# Patient Record
Sex: MALE | Race: WHITE | Employment: UNEMPLOYED | RURAL
[De-identification: names, ages, dates, MRNs, and addresses within clinical notes are randomized per-mention and may not be internally consistent; named-entity substitution may affect disease eponyms.]

---
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
## 2022-01-14 ENCOUNTER — HOSPITAL ENCOUNTER (EMERGENCY)
Facility: HOSPITAL | Age: 22
Discharge: HOME | End: 2022-01-14
Attending: EMERGENCY MEDICINE | Admitting: EMERGENCY MEDICINE

## 2022-01-14 VITALS
SYSTOLIC BLOOD PRESSURE: 120 MMHG | HEIGHT: 69 IN | WEIGHT: 170 LBS | BODY MASS INDEX: 25.18 KG/M2 | HEART RATE: 71 BPM | TEMPERATURE: 98.7 F | DIASTOLIC BLOOD PRESSURE: 73 MMHG | RESPIRATION RATE: 18 BRPM | OXYGEN SATURATION: 98 %

## 2022-01-14 DIAGNOSIS — Z20.822 CLOSE EXPOSURE TO COVID-19 VIRUS: ICD-10-CM

## 2022-01-14 DIAGNOSIS — R19.7 DIARRHEA, UNSPECIFIED TYPE: ICD-10-CM

## 2022-01-14 DIAGNOSIS — J06.9 UPPER RESPIRATORY TRACT INFECTION, UNSPECIFIED TYPE: Primary | ICD-10-CM

## 2022-01-14 LAB
FLUAV RNA RESP QL NAA+PROBE: NOT DETECTED
FLUBV RNA RESP QL NAA+PROBE: NOT DETECTED
RSV RNA NPH QL NAA+NON-PROBE: NOT DETECTED
SARS-COV-2 RNA RESP QL NAA+PROBE: NOT DETECTED

## 2022-01-14 PROCEDURE — 87636 SARSCOV2 & INF A&B AMP PRB: CPT | Performed by: EMERGENCY MEDICINE

## 2022-01-14 PROCEDURE — 99283 EMERGENCY DEPT VISIT LOW MDM: CPT

## 2022-01-14 PROCEDURE — 87637 SARSCOV2&INF A&B&RSV AMP PRB: CPT | Performed by: EMERGENCY MEDICINE

## 2022-01-14 PROCEDURE — C9803 HOPD COVID-19 SPEC COLLECT: HCPCS | Performed by: EMERGENCY MEDICINE

## 2022-01-14 NOTE — ED PROVIDER NOTES
Subjective   Pt is a 20 yo male presenting to ED with complaints of Covid exposure. PMHx significant for back pain. Pt reports developing symptoms about a week ago of congestion, sore throat, cough, body aches, headaches and diarrhea. He reports symptoms have improved but girlfriend tested positive yesterday for Covid. Pt has not been vaccinated for Covid. He works at [employer]. He drinks ETOH occasionally and uses tobacco and marijauan.       History provided by:  Patient and medical records      Review of Systems   Constitutional: Positive for chills and fatigue. Negative for fever.   HENT: Positive for congestion and sore throat.    Eyes: Negative for visual disturbance.   Respiratory: Positive for cough. Negative for shortness of breath.    Cardiovascular: Negative for chest pain.   Gastrointestinal: Positive for diarrhea. Negative for abdominal pain, nausea and vomiting.   Genitourinary: Negative for difficulty urinating.   Musculoskeletal: Positive for myalgias. Negative for arthralgias and back pain.   Neurological: Positive for headaches. Negative for dizziness and weakness.       Past Medical History:   Diagnosis Date   • Herniated lumbar intervertebral disc        No Known Allergies    Past Surgical History:   Procedure Laterality Date   • APPENDECTOMY N/A 9/9/2019    Procedure: APPENDECTOMY LAPAROSCOPIC;  Surgeon: Whitney Costello MD;  Location: [facility];  Service: General   • TOOTH EXTRACTION         No family history on file.    Social History     Socioeconomic History   • Marital status: Single   Tobacco Use   • Smoking status: Current Every Day Smoker     Packs/day: 0.25     Years: 0.50     Pack years: 0.12     Types: Cigarettes   • Smokeless tobacco: Former User   Substance and Sexual Activity   • Alcohol use: Yes     Comment: one pint of whisky once a month   • Drug use: Yes     Types: Marijuana     Comment: 2 months ago   • Sexual activity: Defer           Objective   Physical Exam  Vitals and  "nursing note reviewed.   Constitutional:       General: He is not in acute distress.  HENT:      Head: Atraumatic.   Eyes:      Extraocular Movements: Extraocular movements intact.      Conjunctiva/sclera: Conjunctivae normal.   Cardiovascular:      Rate and Rhythm: Normal rate and regular rhythm.   Pulmonary:      Effort: Pulmonary effort is normal. No respiratory distress.   Musculoskeletal:         General: Normal range of motion.      Cervical back: Normal range of motion and neck supple.   Skin:     General: Skin is warm.      Capillary Refill: Capillary refill takes less than 2 seconds.   Neurological:      General: No focal deficit present.      Mental Status: He is alert.   Psychiatric:         Mood and Affect: Mood normal.         Behavior: Behavior normal.         Procedures           ED Course      Discussed results and tx plan with patient. He will tx symptoms with OTC meds. He will be contacted if Covid test is positive. Went over new / worse sx to return to ED.     No results found for this or any previous visit (from the past 24 hour(s)).  Note: In addition to lab results from this visit, the labs listed above may include labs taken at another facility or during a different encounter within the last 24 hours. Please correlate lab times with ED admission and discharge times for further clarification of the services performed during this visit.    No orders to display     Vitals:    01/14/22 1419   BP: 120/73   BP Location: Left arm   Patient Position: Sitting   Pulse: 71   Resp: 18   Temp: 98.7 °F (37.1 °C)   TempSrc: Oral   SpO2: 98%   Weight: 77.1 kg (170 lb)   Height: 175.3 cm (69\")     Medications - No data to display  ECG/EMG Results (last 24 hours)     ** No results found for the last 24 hours. **        No orders to display       DISCHARGE    Patient discharged in stable condition.    Reviewed implications of results, diagnosis, meds, responsibility to follow up, warning signs and symptoms of " possible worsening, potential complications and reasons to return to ER.    Patient/Family voiced understanding of above instructions.    Discussed plan for discharge, as there is no emergent indication for admission.  Pt/family is agreeable and understands need for follow up and possible repeat testing.  Pt/family is aware that discharge does not mean that nothing is wrong but that it indicates no emergency is currently present that requires admission and they must continue care with follow-up as given below or with a physician of their choice.     FOLLOW-UP  Suzie Gamboa MD  [address] [place] [id number]  [phone]    Schedule an appointment as soon as possible for a visit       [facility] Emergency Department  [address] [place] [address] [phone]    If symptoms worsen         Medication List      No changes were made to your prescriptions during this visit.                                                  MDM    Final diagnoses:   Upper respiratory tract infection, unspecified type   Close exposure to COVID-19 virus   Diarrhea, unspecified type       ED Disposition  ED Disposition     ED Disposition Condition Comment    Discharge Stable           Suzie Gamboa MD  [address] [place] [id number]  [phone]    Schedule an appointment as soon as possible for a visit       [facility] Emergency Department  [address] [place] [address] [phone]    If symptoms worsen         Medication List      No changes were made to your prescriptions during this visit.          Dedra Link PA  01/14/22 1508

## 2022-05-01 NOTE — DISCHARGE INSTRUCTIONS
No pushing, pulling, tugging, heavy lifting, or strenuous activity.  No major decision making, driving, or drinking alcoholic beverages for 24 hours. (due to the medications you have received)  Always use good hand hygiene/washing techniques.  NO driving while taking pain medications.    To assist you in voiding:  Drink plenty of fluids  Listen to running water while attempting to void.    If you are unable to urinate and you have an uncomfortable urge to void or it has been   6 hours since you were discharged, return to the Emergency Room  
show

## 2023-02-05 ENCOUNTER — HOSPITAL ENCOUNTER (EMERGENCY)
Facility: HOSPITAL | Age: 23
Discharge: HOME | End: 2023-02-05
Attending: HOSPITALIST
Payer: MEDICAID

## 2023-02-05 ENCOUNTER — APPOINTMENT (OUTPATIENT)
Dept: GENERAL RADIOLOGY | Facility: HOSPITAL | Age: 23
End: 2023-02-05
Payer: MEDICAID

## 2023-02-05 VITALS
HEIGHT: 69 IN | BODY MASS INDEX: 28.14 KG/M2 | WEIGHT: 190 LBS | SYSTOLIC BLOOD PRESSURE: 126 MMHG | TEMPERATURE: 98.1 F | DIASTOLIC BLOOD PRESSURE: 77 MMHG | HEART RATE: 67 BPM | RESPIRATION RATE: 20 BRPM | OXYGEN SATURATION: 97 %

## 2023-02-05 DIAGNOSIS — S62.336A CLOSED DISPLACED FRACTURE OF NECK OF FIFTH METACARPAL BONE OF RIGHT HAND, INITIAL ENCOUNTER: Primary | ICD-10-CM

## 2023-02-05 PROCEDURE — 73130 X-RAY EXAM OF HAND: CPT

## 2023-02-05 PROCEDURE — 29125 APPL SHORT ARM SPLINT STATIC: CPT

## 2023-02-05 PROCEDURE — 99283 EMERGENCY DEPT VISIT LOW MDM: CPT

## 2023-02-05 ASSESSMENT — PAIN SCALES - GENERAL: PAINLEVEL_OUTOF10: 0

## 2023-02-05 ASSESSMENT — PAIN - FUNCTIONAL ASSESSMENT: PAIN_FUNCTIONAL_ASSESSMENT: 0-10

## 2023-02-05 NOTE — ED PROVIDER NOTES
[address] [facility]      Pt Name: Agustin Drew  MRN: [id number]  YOB: 2000  Date of evaluation: 2/5/2023  Provider: Javan Bernal DO    CHIEF COMPLAINT       Chief Complaint   Patient presents with    Hand Injury     Patient stated he was changing a tire and injured his right hand and right side of forehead last night. HISTORY OF PRESENT ILLNESS  (Location/Symptom, Timing/Onset, Context/Setting, Quality, Duration, Modifying Factors, Severity.)   Agustin Drew is a 23 y.o. male who presents to the emergency department for right hand pain. Patient states that he was working last night trying to change a tire with a breaking bar when it slipped he Maudine Lauren forward and his hand hit the tire and then he fell against a car in his hand both. Patient states he also hit his head but he had no loss of consciousness. States he did not have any episodes of vomiting 2 hours within the head injury. Patient states that his head is perfectly fine but what is bothering him is his right hand. States he has had a previous boxer's fracture to that hand in the past.  He is complaining of pain over the fifth and fourth metacarpal area of the hand. Patient states he does have some color change or bruising to the dorsum of the hand over the area of the third metacarpal.  He also has some mild bruising to the palm of the hand also. He denies any wrist pain. He denies any elbow pain denies any shoulder pain. Patient is right-hand dominant. Denies any numbness tingling weakness to the hand. He states as long as he is not do anything with it he did really does not have any pain at the moment he touches it or moves it and his pain is about a 8 out of 10. Describes as a sharp sensation. Nursing notes were reviewed.     REVIEW OFSYSTEMS    (2-9 systems for level 4, 10 or more for level 5)   ROS:  General:  No fevers, no chills, no weakness  Cardiovascular:  No chest pain, no palpitations  Respiratory:  No shortness of breath, no cough, no wheezing  Gastrointestinal:  No pain, no nausea, no vomiting, no diarrhea  Musculoskeletal:  No muscle pain, +right hand pain  Skin:  No rash, no easy bruising  Neurologic:  No speech problems, no headache, no extremity weakness  Psychiatric:  No anxiety  Genitourinary:  No dysuria, no hematuria    Except as noted above the remainder of the review of systems was reviewed and negative. PAST MEDICAL HISTORY     Past Medical History:   Diagnosis Date    Lumbar herniated disc          SURGICAL HISTORY       Past Surgical History:   Procedure Laterality Date    APPENDECTOMY      MOUTH SURGERY           CURRENT MEDICATIONS       Previous Medications    No medications on file       ALLERGIES     Patient has no known allergies. FAMILY HISTORY     History reviewed. No pertinent family history. SOCIAL HISTORY       Social History     Socioeconomic History    Marital status: Single     Spouse name: None    Number of children: None    Years of education: None    Highest education level: None   Tobacco Use    Smoking status: Every Day     Packs/day: 0.25     Years: 2.00     Pack years: 0.50     Types: Cigarettes    Smokeless tobacco: Former   Substance and Sexual Activity    Alcohol use: No    Drug use: No         PHYSICAL EXAM    (up to 7 for level 4, 8 or more for level 5)     ED Triage Vitals [02/05/23 1041]   BP Temp Temp Source Heart Rate Resp SpO2 Height Weight   126/77 98.1 °F (36.7 °C) Oral 67 20 97 % 5' 9\" (1.753 m) 190 lb (86.2 kg)       Physical Exam  General :Patient is awake, alert, oriented, in no acute distress, nontoxic appearing  HEENT: Pupils are equally round and reactive to light, EOMI  Cardiac: Heart regular rate, rhythm, no murmurs, rubs, or gallops  Musculoskeletal: No focal muscle deficits are appreciated, decreased range of motion to the fourth and fifth finger of the right hand. Cap refill is less than 3 seconds distally. Neurovascular intact distally. Patient's able to flex and extend the first second and third digits but difficulty with the fourth and fifth. He is able to flex and extend against resistance with the first second third digit but not with the fourth or fifth digit secondary to pain. She does have area of contusion over the dorsum of the hand over the third metacarpal area. He also has some mild bruising to the palmar aspect which is about mid palm which would line up over the fourth metacarpal area. Neuro: Motor intact, sensory intact, level of consciousness is normal  Dermatology: Skin is warm and dry  Psych: Mentation is grossly normal, cognition is grossly normal. Affect is appropriate. DIAGNOSTIC RESULTS     EKG: All EKG's are interpreted by the Emergency Department Physician who either signs or Co-signs this chart in the [address] a cardiologist.        RADIOLOGY:   Non-plain film images such as CT, Ultrasound and MRI are read by the radiologist. Plain radiographic images are visualized and preliminarily interpreted by the emergency physician with the below findings:      [] Radiologist's Report Reviewed:  XR HAND RIGHT (MIN 3 VIEWS)   Final Result      Distal fifth metacarpal oblique fracture with overlying soft tissue swelling and pulmonary angulation                  ED BEDSIDE ULTRASOUND:   Performed by ED Physician - none    LABS:    I have reviewed and interpreted all of the currently available lab results from this visit (ifapplicable):  No results found for this visit on 02/05/23. All other labs were within normal range or not returned as of this dictation.     EMERGENCY DEPARTMENT COURSE and DIFFERENTIAL DIAGNOSIS/MDM:   Vitals:    Vitals:    02/05/23 1041   BP: 126/77   Pulse: 67   Resp: 20   Temp: 98.1 °F (36.7 °C)   TempSrc: Oral   SpO2: 97%   Weight: 190 lb (86.2 kg)   Height: 5' 9\" (1.753 m)       MEDICATIONS ADMINISTERED IN ED:  Medications - No data to display    Initial evaluation examination I did have conversation with the patient about upcoming plan, treatment and possible disposition which they are agreeable to the times dictation. Advised that that we going perform radiograph of the right hand to rule out any acute bony abnormality. Hopefully this is just a contusion. Patient was offered something for pain here but he declined. Patient is icing the hand when I entered the room. I did discuss with him about the use of RICE for symptom relief. Advised that when he is icing it 20 minutes on 20 minutes off do not place it directly against the skin he does state his understanding of this. Patient final disposition be determined once his radiological studies been performed reviewed. Otherwise he is resting comfortably stretcher no acute distress nontoxic-appearing. Radiograph the right hand read by radiology as distal fifth metacarpal oblique fracture with overlying soft tissue swelling and angulation    Patient's diagnostic studies were discussed with him and he does state his understanding. Advised that the fracture will need splinting. He was agreeable for this we did place a ulnar gutter type splint to please see procedure note for full details he tolerated this well. Patient was offered something stronger than Tylenol Motrin for home but he declined. He also is refusing a work excuse. Patient advised that he does need to follow-up with orthopedics within the next 1 week for reevaluation. We will give him the information for local orthopedic physician which he states he has had seen several times in the past.  Otherwise the patient will be discharged home in stable condition. The patient advised they does need to follow-up with his regular family physician within the next 1 to 2 days for evaluation.   He was also given instructions if symptoms worsens or new symptoms arise he should return back to emergency department for further evaluation work-up. Is this patient to be included in the SEP-1 Core Measure due to severe sepsis or septic shock? No   Exclusion criteria - the patient is NOT to be included for SEP-1 Core Measure due to: Infection is not suspected          CONSULTS:  None    PROCEDURES:  Splint Application    Date/Time: 2/5/2023 12:46 PM  Performed by: Korey Rodriguez DO  Authorized by: Korey Rodriguez DO     Consent:     Consent obtained:  Verbal    Consent given by:  Patient    Risks, benefits, and alternatives were discussed: yes      Risks discussed:  Discoloration, numbness, pain and swelling  Universal protocol:     Procedure explained and questions answered to patient or proxy's satisfaction: yes      Imaging studies available: yes      Patient identity confirmed:  Verbally with patient and arm band  Pre-procedure details:     Distal neurologic exam:  Normal    Distal perfusion: distal pulses strong and brisk capillary refill    Procedure details:     Location:  Hand    Hand location:  R hand    Strapping: no      Splint type:  Ulnar gutter    Supplies:  Cotton padding, elastic bandage and fiberglass    Attestation: Splint applied and adjusted personally by me    Post-procedure details:     Distal neurologic exam:  Normal    Distal perfusion: distal pulses strong      Procedure completion:  Tolerated well, no immediate complications    Post-procedure imaging: not applicable      CRITICAL CARE TIME    Total Critical Care time was 0 minutes, excluding separately reportable procedures. There was a high probability of clinically significant/life threatening deterioration in the patient's condition which required my urgent intervention. FINAL IMPRESSION      1.  Closed displaced fracture of neck of fifth metacarpal bone of right hand, initial encounter          DISPOSITION/PLAN   DISPOSITION Discharge - Pending Orders Complete 02/05/2023 12:45:59 PM      PATIENT REFERRED TO:  Your PCP  1 to 2 days for evaluation. [facility] Emergency Department  [address] [facility] [address] [address] [id number] [phone]    As needed, If symptoms worsen    Javad Hernandez MD  [address] [facility] [address], [place] [address]  [phone]          DISCHARGE MEDICATIONS:  New Prescriptions    No medications on file       Comment: Please note this report has been produced using speech recognition software and may contain errorsrelated to that system including errors in grammar, punctuation, and spelling, as well as words and phrases that may be inappropriate. If there are any questions or concerns please feel free to contact the dictating providerfor clarification.     Yolanda Villalba DO  Attending Emergency Physician               Yolanda Villalba DO  02/05/23 1241

## 2023-02-06 ENCOUNTER — OFFICE VISIT (OUTPATIENT)
Dept: ORTHOPEDIC SURGERY | Facility: CLINIC | Age: 23
End: 2023-02-06
Payer: COMMERCIAL

## 2023-02-06 VITALS — HEIGHT: 69 IN | WEIGHT: 194.6 LBS | TEMPERATURE: 97.6 F | BODY MASS INDEX: 28.82 KG/M2

## 2023-02-06 DIAGNOSIS — S62.336A CLOSED DISPLACED FRACTURE OF NECK OF FIFTH METACARPAL BONE OF RIGHT HAND, INITIAL ENCOUNTER: Primary | ICD-10-CM

## 2023-02-06 PROCEDURE — 99203 OFFICE O/P NEW LOW 30 MIN: CPT | Performed by: PHYSICIAN ASSISTANT

## 2023-02-06 PROCEDURE — 29125 APPL SHORT ARM SPLINT STATIC: CPT | Performed by: PHYSICIAN ASSISTANT

## 2023-02-06 NOTE — PROGRESS NOTES
Subjective   Patient ID: Jason Dorado is a 22 y.o. right hand dominant male  Injury of the Right Hand (States he fell into his hand while changing a tire on 2/4/2023. Went to the ER the next day.)         History of Present Illness    Patient presents as a new patient for eval of right hand pain.   He injured his right hand while changing a tire on 2-4-23.  The bar slipped and he fell forward, landing on his hand. His was evaluated at [facility] and was Dx with hand 5th MC fracture.   He denies wrist pain. Denies numbness or tingling.         Past Medical History:   Diagnosis Date   • Herniated lumbar intervertebral disc         Past Surgical History:   Procedure Laterality Date   • APPENDECTOMY N/A 9/9/2019    Procedure: APPENDECTOMY LAPAROSCOPIC;  Surgeon: Whitney Costello MD;  Location: [facility];  Service: General   • TOOTH EXTRACTION         Family History   Problem Relation Age of Onset   • Heart defect Maternal Grandmother        Social History     Socioeconomic History   • Marital status: Single   Tobacco Use   • Smoking status: Every Day     Packs/day: 0.25     Years: 0.50     Pack years: 0.13     Types: Cigarettes   • Smokeless tobacco: Former   Substance and Sexual Activity   • Alcohol use: Yes     Comment: one pint of whisky once a month   • Drug use: Yes     Types: Marijuana     Comment: 2 months ago   • Sexual activity: Defer         Current Outpatient Medications:   •  ibuprofen (ADVIL,MOTRIN) 100 MG/5ML suspension, Take 800 mg by mouth Every 8 (Eight) Hours As Needed for Mild Pain ., Disp: , Rfl:     No Known Allergies    Review of Systems   Constitutional: Negative for fever.   HENT: Negative for dental problem and voice change.    Eyes: Negative for visual disturbance.   Respiratory: Negative for shortness of breath.    Cardiovascular: Negative for chest pain.   Gastrointestinal: Negative for abdominal pain.   Genitourinary: Negative for dysuria.   Musculoskeletal: Positive for arthralgias (right hand)  "and joint swelling (right hand). Negative for gait problem.   Skin: Negative for rash.   Neurological: Negative for speech difficulty.   Hematological: Does not bruise/bleed easily.   Psychiatric/Behavioral: Negative for confusion.       I have reviewed the medical and surgical history, family history, social history, medications, and/or allergies, and the review of systems of this report.    Objective   Temp 97.6 °F (36.4 °C)   Ht 175.3 cm (69\")   Wt 88.3 kg (194 lb 9.6 oz)   BMI 28.74 kg/m²    Physical Exam  Vitals and nursing note reviewed.   Constitutional:       Appearance: Normal appearance.   Cardiovascular:      Pulses: Normal pulses.   Pulmonary:      Effort: Pulmonary effort is normal.   Musculoskeletal:      Right wrist: No swelling, deformity, tenderness, bony tenderness or snuff box tenderness. Normal pulse.      Right hand: Swelling (dorsum of hand) and tenderness (5th MC) present. No deformity. Normal sensation. Normal capillary refill. Normal pulse.   Neurological:      Mental Status: He is alert and oriented to person, place, and time.       Ortho Exam     Neurologic Exam     Mental Status   Oriented to person, place, and time.              Assessment & Plan   Independent Review of Radiographic Studies:    No new imaging done today.    Impression      Distal fifth metacarpal oblique fracture with overlying soft tissue swelling and pulmonary angulation  Narrative    THREE VIEWS OF THE RIGHT HAND     HISTORY: Trauma and pain.     PROCEDURE: AP, Lateral and Oblique views were obtained     FINDINGS:     Multiple views obtained demonstrate oblique fracture with dorsal angulation of the fifth metacarpals head with overlying soft tissue swelling and slight angular deformity. No other discrete fracture line evident.     There is no sign of a radiopaque foreign body or erosion  Exam End: 02/05/23 12:02    Specimen Collected: 02/05/23 12:20 Last Resulted: 02/05/23 12:21   Received From: [facility] " [facility]        Procedures       Diagnoses and all orders for this visit:    1. Closed displaced fracture of neck of fifth metacarpal bone of right hand, initial encounter (Primary)       Discussion of orthopedic goals  Risk, benefits, and merits of treatment alternatives reviewed with the patient and questions answered  Reduced physical activity as appropriate  Avoid offending activity  splint care instructions given    Recommendations/Plan:  Patient is encouraged to call or return for any issues or concerns.    We had a lengthy discussion in the clinic regarding treatment options available for the right hand fifth metacarpal fracture.  We discussed the option of closed reduction pinning versus immobilization without surgery and a boxers Ortho-Glass splint.  I did have the orthopedic surgeon to review the images as well.  Both the surgeon and I do not feel he would have any additional benefit from closed reduction pinning due to the comminuted fragment pieces of the distal fifth metacarpal.  This was relayed to the patient and he would like to proceed without surgical intervention.    Follow up in 2 weeks xoa  Patient was placed in an Ortho-Glass ulnar gutter splint.  Patient neurovascularly intact pre and postplacement  Patient agreeable to call or return sooner for any concerns.        EMR Dragon-transcription disclaimer:  This encounter note is an electronic transcription of spoken language to printed text.  Electronic transcription of spoken language may permit erroneous or at times nonsensical words or phrases to be inadvertently transcribed.  Although I have reviewed the note for such errors, some may still exist

## 2023-02-17 DIAGNOSIS — S62.336A CLOSED DISPLACED FRACTURE OF NECK OF FIFTH METACARPAL BONE OF RIGHT HAND, INITIAL ENCOUNTER: Primary | ICD-10-CM

## 2023-03-29 DIAGNOSIS — Z31.9 INFERTILITY MANAGEMENT: Primary | ICD-10-CM

## 2023-06-21 LAB
CHARACTER SMN: ABNORMAL
COLOR SMN: ABNORMAL
LIQUEFACTION TIME SMN: ABNORMAL MIN
PH SMN: 8 [PH]
SPECIMEN VOL SMN: 2 ML (ref 2–5)
SPERM # SMN: 54 MILLIONS/ML
SPERM MORPHOLOGY COMMENT: ABNORMAL
SPERM MOTILE NFR SMN: 50 % MOTILE (ref 50–100)
VISC SMN: ABNORMAL CP

## 2023-07-06 LAB
CHARACTER SMN: ABNORMAL
COLOR SMN: ABNORMAL
LIQUEFACTION TIME SMN: ABNORMAL MIN
PH SMN: 8 [PH]
SPECIMEN VOL SMN: 2 ML (ref 2–5)
SPERM # SMN: 54 MILLIONS/ML
SPERM MORPHOLOGY COMMENT: ABNORMAL
SPERM MOTILE NFR SMN: 50 % MOTILE (ref 50–100)
VISC SMN: ABNORMAL CP

## (undated) DEVICE — MONOPOLAR METZENBAUM SCISSOR TIP, DISPOSABLE: Brand: MONOPOLAR METZENBAUM SCISSOR TIP, DISPOSABLE

## (undated) DEVICE — ENDOPATH XCEL BLADELESS TROCARS WITH STABILITY SLEEVES: Brand: ENDOPATH XCEL

## (undated) DEVICE — ENDOPATH ETS-FLEX45 ARTICULATING ENDOSCOPIC LINEAR CUTTER, NO RELOAD: Brand: ENDOPATH

## (undated) DEVICE — ENDOPOUCH RETRIEVER SPECIMEN RETRIEVAL BAGS: Brand: ENDOPOUCH RETRIEVER

## (undated) DEVICE — 2, DISPOSABLE SUCTION/IRRIGATOR WITHOUT DISPOSABLE TIP: Brand: STRYKEFLOW

## (undated) DEVICE — ENDOPATH XCEL UNIVERSAL TROCAR STABLILITY SLEEVES: Brand: ENDOPATH XCEL

## (undated) DEVICE — 3M™ STERI-STRIP™ REINFORCED ADHESIVE SKIN CLOSURES, R1547, 1/2 IN X 4 IN (12 MM X 100 MM), 6 STRIPS/ENVELOPE: Brand: 3M™ STERI-STRIP™

## (undated) DEVICE — RICH GENERAL LAPAROSCOPY: Brand: MEDLINE INDUSTRIES, INC.

## (undated) DEVICE — SOL NACL 0.9PCT 1000ML

## (undated) DEVICE — UNDYED BRAIDED (POLYGLACTIN 910), SYNTHETIC ABSORBABLE SUTURE: Brand: COATED VICRYL

## (undated) DEVICE — GLV SURG SENSICARE W/ALOE PF LF 7.5 STRL

## (undated) DEVICE — SUT VIC 0/0 UR6 27IN DYED J603H

## (undated) DEVICE — CUFF SCD HEMOFORCE SEQ CALF STD MD